# Patient Record
Sex: MALE | Race: BLACK OR AFRICAN AMERICAN | NOT HISPANIC OR LATINO | URBAN - METROPOLITAN AREA
[De-identification: names, ages, dates, MRNs, and addresses within clinical notes are randomized per-mention and may not be internally consistent; named-entity substitution may affect disease eponyms.]

---

## 2019-01-09 PROBLEM — I10 BENIGN ESSENTIAL HYPERTENSION: Status: ACTIVE | Noted: 2019-01-09

## 2019-01-09 PROBLEM — E66.01 MORBID OBESITY (HCC): Status: ACTIVE | Noted: 2019-01-09

## 2019-01-09 PROBLEM — R73.03 PREDIABETES: Status: ACTIVE | Noted: 2019-01-09

## 2019-01-09 PROBLEM — F90.0 ATTENTION DEFICIT HYPERACTIVITY DISORDER, PREDOMINANTLY INATTENTIVE TYPE: Status: ACTIVE | Noted: 2019-01-09

## 2019-02-20 PROBLEM — F98.8 CHILD ATTENTION DEFICIT DISORDER: Status: ACTIVE | Noted: 2019-02-20

## 2019-06-17 ENCOUNTER — TELEPHONE (OUTPATIENT)
Dept: INTERNAL MEDICINE | Facility: CLINIC | Age: 39
End: 2019-06-17

## 2019-06-17 RX ORDER — HYDROCHLOROTHIAZIDE 25 MG/1
25 TABLET ORAL DAILY
Qty: 30 TABLET | Refills: 0 | Status: SHIPPED | OUTPATIENT
Start: 2019-06-17

## 2019-06-17 RX ORDER — AMLODIPINE BESYLATE 10 MG/1
10 TABLET ORAL DAILY
Qty: 30 TABLET | Refills: 0 | Status: SHIPPED | OUTPATIENT
Start: 2019-06-17

## 2019-06-17 NOTE — TELEPHONE ENCOUNTER
PT NEEDS REFILL ON  Amlodipine 10 mg daily and hydrochlorothiazide 25 mg daily  Send to [facility] [address]

## 2024-02-20 ENCOUNTER — TELEPHONE (OUTPATIENT)
Dept: INTERNAL MEDICINE | Facility: CLINIC | Age: 44
End: 2024-02-20
Payer: COMMERCIAL

## 2024-02-20 NOTE — TELEPHONE ENCOUNTER
Caller: Drew Lobo    Relationship to patient: Self    Best call back number: [phone]    Chief complaint: INTESTINAL ISSUES, REFERRAL    Type of visit: OV/NEW PATIENT    Requested date: ASAP     If rescheduling, when is the original appointment:      Additional notes:PATIENT STATES THAT HE HAS BEEN A PATIENT SINCE A TODDLER. PATIENT HASN'T BEEN SEEN IN 5 YEARS AND STILL WOULD LIKE TO SEE DR. CRAWFORD

## 2024-02-23 ENCOUNTER — OFFICE VISIT (OUTPATIENT)
Dept: INTERNAL MEDICINE | Facility: CLINIC | Age: 44
End: 2024-02-23
Payer: COMMERCIAL

## 2024-02-23 VITALS
WEIGHT: 250.2 LBS | DIASTOLIC BLOOD PRESSURE: 110 MMHG | HEIGHT: 69 IN | TEMPERATURE: 98.7 F | BODY MASS INDEX: 37.06 KG/M2 | SYSTOLIC BLOOD PRESSURE: 148 MMHG | OXYGEN SATURATION: 96 % | HEART RATE: 113 BPM

## 2024-02-23 DIAGNOSIS — R53.83 OTHER FATIGUE: ICD-10-CM

## 2024-02-23 DIAGNOSIS — E78.5 DYSLIPIDEMIA: ICD-10-CM

## 2024-02-23 DIAGNOSIS — R10.11 RIGHT UPPER QUADRANT ABDOMINAL PAIN: ICD-10-CM

## 2024-02-23 DIAGNOSIS — I10 BENIGN ESSENTIAL HYPERTENSION: Primary | ICD-10-CM

## 2024-02-23 DIAGNOSIS — R73.03 PREDIABETES: ICD-10-CM

## 2024-02-23 PROCEDURE — 99203 OFFICE O/P NEW LOW 30 MIN: CPT | Performed by: INTERNAL MEDICINE

## 2024-02-23 RX ORDER — HYDROCHLOROTHIAZIDE 25 MG/1
25 TABLET ORAL DAILY
Qty: 30 TABLET | Refills: 5 | Status: SHIPPED | OUTPATIENT
Start: 2024-02-23

## 2024-02-23 RX ORDER — LISINOPRIL 10 MG/1
10 TABLET ORAL DAILY
Qty: 30 TABLET | Refills: 5 | Status: SHIPPED | OUTPATIENT
Start: 2024-02-23

## 2024-02-23 RX ORDER — AMLODIPINE BESYLATE 10 MG/1
10 TABLET ORAL DAILY
Qty: 30 TABLET | Refills: 5 | Status: SHIPPED | OUTPATIENT
Start: 2024-02-23

## 2024-02-23 NOTE — PROGRESS NOTES
[facility]     Drew Lobo  1980   [id number]      Patient Care Team:  Flo Wong MD as PCP - General (Internal Medicine)    Chief Complaint::   Chief Complaint   Patient presents with    Abdominal Pain    Establish Care        HPI  The patient presents today for follow-up of hypertension, prediabetes, and abdominal pain. I haven't seen for at least 5 years.      He has iatrophobia and avoids hospital visits as possible.     He takes his blood pressure medication most days but not daily. The last time he was at the dentist, his blood pressure was about the same.     He reports constant lower abdominal cramping every day, feels like cramping in intestines not abdomen. He states that symptoms started a couple of months ago as discomfort. He reports mucus in stool. He denies any problems with urination.    He denies any swelling in his feet. He reports intermittent tingling of left foot. He denies any back pain. He sits on the floor with his legs crossed all the time. It has been going on for a while. He reports fatigue. He is taking amlodipine and hydrochlorothiazide.     He is single.    His father had diabetes.      Chronic Conditions:      Patient Active Problem List   Diagnosis    Attention deficit hyperactivity disorder, predominantly inattentive type    Benign essential hypertension    Prediabetes    Morbid obesity    Child attention deficit disorder        No past medical history on file.    No past surgical history on file.    Family History   Problem Relation Age of Onset    Migraines Mother     Coronary artery disease Father     Obesity Father     Hypertension Father     Diabetes Father        Social History     Socioeconomic History    Marital status: Single   Tobacco Use    Smoking status: Never     Passive exposure: Never    Smokeless tobacco: Never   Substance and Sexual Activity    Alcohol use: Yes     Alcohol/week: 4.0 standard drinks of alcohol     Types: 4 Cans of beer  "per week       No Known Allergies      Current Outpatient Medications:     amLODIPine (NORVASC) 10 MG tablet, Take 1 tablet by mouth Daily., Disp: 30 tablet, Rfl: 5    hydroCHLOROthiazide 25 MG tablet, Take 1 tablet by mouth Daily., Disp: 30 tablet, Rfl: 5    lisinopril (PRINIVIL,ZESTRIL) 10 MG tablet, Take 1 tablet by mouth Daily., Disp: 30 tablet, Rfl: 5    Review of Systems   Constitutional:  Positive for fatigue.   Gastrointestinal:         Positive for constant lower abdominal cramping and mucus in stool.    Neurological:         Positive intermittent tingling of left foot.         Vital Signs  Vitals:    02/23/24 1445   BP: (!) 148/110   BP Location: Right arm   Patient Position: Sitting   Cuff Size: Adult   Pulse: 113   Temp: 98.7 °F (37.1 °C)   TempSrc: Infrared   SpO2: 96%   Weight: 113 kg (250 lb 3.2 oz)   Height: 175.3 cm (69\")   PainSc:   3   PainLoc: Abdomen       Physical Exam  Vitals reviewed.   Constitutional:       Appearance: He is well-developed. He is obese.   HENT:      Head: Normocephalic and atraumatic.   Neck:      Thyroid: No thyromegaly.      Vascular: No carotid bruit.   Cardiovascular:      Rate and Rhythm: Normal rate and regular rhythm.      Heart sounds: Normal heart sounds. No murmur heard.     No friction rub. No gallop.   Pulmonary:      Effort: Pulmonary effort is normal.      Breath sounds: Normal breath sounds.   Abdominal:      General: Bowel sounds are normal.      Palpations: Abdomen is soft. There is no mass.      Comments: There is minimal suprapubic tenderness without guarding or rebound.    Musculoskeletal:      Cervical back: Normal range of motion and neck supple.      Right lower leg: No edema.      Left lower leg: No edema.   Skin:     General: Skin is warm and dry.   Neurological:      Mental Status: He is oriented to person, place, and time.      Cranial Nerves: No cranial nerve deficit.          Procedures    ACE III MINI             Assessment/Plan:    Diagnoses " and all orders for this visit:    1. Benign essential hypertension (Primary)  This is completely uncontrolled. He has been on amlodipine and hydrochlorothiazide continuously. We discussed the cardiovascular risk of not controlling this blood pressure. I will add lisinopril and await labs. We will plan to follow this up in 1 month.     -     Microalbumin / Creatinine Urine Ratio - Urine, Clean Catch; Future  -     CBC & Differential; Future    2. Prediabetes  A1c is pending. His father had diabetes. For now, the treatment remains healthy diet and efforts at weight loss.    -     Hemoglobin A1c; Future    3. Dyslipidemia  -     Comprehensive Metabolic Panel; Future  -     Lipid Panel; Future  -     Apolipoprotein B; Future  -     Homocysteine; Future    4. Right upper quadrant abdominal pain  I will obtain laboratory studies. If these are negative, he will likely need CT scan and colonoscopy.    -     CBC & Differential; Future  -     Lipase; Future  -     Urinalysis With Microscopic - Urine, Clean Catch; Future    5. Other fatigue  -     TSH; Future  -     Vitamin B12; Future    Other orders  -     hydroCHLOROthiazide 25 MG tablet; Take 1 tablet by mouth Daily.  Dispense: 30 tablet; Refill: 5  -     amLODIPine (NORVASC) 10 MG tablet; Take 1 tablet by mouth Daily.  Dispense: 30 tablet; Refill: 5  -     lisinopril (PRINIVIL,ZESTRIL) 10 MG tablet; Take 1 tablet by mouth Daily.  Dispense: 30 tablet; Refill: 5      Follow-up  The patient will follow up in 1 month.    Plan of care reviewed with patient at the conclusion of today's visit. Education was provided regarding diagnosis, management, and any prescribed or recommended OTC medications.Patient verbalizes understanding of and agreement with management plan.       Transcribed from ambient dictation for Flo Wong MD by Giancarlo Mcfarland   02/23/24   21:37 EST    Patient or patient representative verbalized consent to the visit recording.  I have personally  performed the services described in this document as transcribed by the above individual, and it is both accurate and complete.

## 2024-02-27 DIAGNOSIS — R73.09 ABNORMAL GLUCOSE: Primary | ICD-10-CM

## 2024-02-28 ENCOUNTER — PATIENT ROUNDING (BHMG ONLY) (OUTPATIENT)
Dept: INTERNAL MEDICINE | Facility: CLINIC | Age: 44
End: 2024-02-28
Payer: COMMERCIAL

## 2024-02-28 DIAGNOSIS — R73.09 ABNORMAL GLUCOSE: Primary | ICD-10-CM

## 2024-02-29 ENCOUNTER — TELEPHONE (OUTPATIENT)
Dept: INTERNAL MEDICINE | Facility: CLINIC | Age: 44
End: 2024-02-29
Payer: COMMERCIAL

## 2024-02-29 DIAGNOSIS — R73.09 ABNORMAL GLUCOSE: Primary | ICD-10-CM

## 2024-02-29 NOTE — TELEPHONE ENCOUNTER
Spoke with [facility] lab and stated they have missed adding A1C into the order and they will find specimen to collect A1C .

## 2024-03-07 ENCOUNTER — TELEPHONE (OUTPATIENT)
Dept: INTERNAL MEDICINE | Facility: CLINIC | Age: 44
End: 2024-03-07
Payer: COMMERCIAL

## 2024-03-07 DIAGNOSIS — E11.649 UNCONTROLLED TYPE 2 DIABETES MELLITUS WITH HYPOGLYCEMIA WITHOUT COMA: Primary | ICD-10-CM

## 2024-03-07 RX ORDER — ACYCLOVIR 400 MG/1
1 TABLET ORAL CONTINUOUS
Qty: 3 EACH | Refills: 5 | Status: SHIPPED | OUTPATIENT
Start: 2024-03-07

## 2024-03-07 RX ORDER — ACYCLOVIR 400 MG/1
1 TABLET ORAL CONTINUOUS
Qty: 1 EACH | Refills: 0 | Status: SHIPPED | OUTPATIENT
Start: 2024-03-07

## 2024-03-07 RX ORDER — METFORMIN HYDROCHLORIDE 500 MG/1
500 TABLET, EXTENDED RELEASE ORAL
Qty: 90 TABLET | Refills: 1 | Status: SHIPPED | OUTPATIENT
Start: 2024-03-07

## 2024-03-07 NOTE — TELEPHONE ENCOUNTER
PATIENT HAD CALLED REGARDING LAB RESULTS FOR A1C.    [facility] HAS RESOLVED PATIENTS A1C FROM PREVIOUS LAB DRAWN THEY WERE ABLE TO ADD IT TO THE ORDER. RESULTS ARE IN

## 2024-03-07 NOTE — TELEPHONE ENCOUNTER
Thank you.  Please let him know his Ac result, and that anything over 6.5 is diabetes.  His is very high.  He should begin a diabetic diet, the simple version of which is to restrict sweets and simple carbs, ie, foods made out of white flower.  It is often helpful to see a dietician for more detailed information and diabetes education.  [facility] used to have a dietitian on staff.  I do not know if they still do, but if they do I can make referral there so it would be more convenient.  Significant weight loss will dramatically bring down his glucoses, but a weight loss of 10 pounds or so would still be very helpful.  Instead of checking glucoses every day by fingerstick, I would recommend that he have a continuous glucose monitor.  I will send a prescription to the pharmacy, if his insurance does not cover it he should let us know.  If his A1c were 7.0 or less, I would see if he could lower it with diet alone.  However, with an A1c over 10 he definitely needs to be on medication.  I am sending metformin to the pharmacy.  He will start out taking it once a day, we will gradually increase the dose.  Diarrhea and stomach upset occurs sometimes due to metformin.  If this happens he should let us know I do not expect him to put up with that.    He has an appointment on 3/22.

## 2024-03-08 ENCOUNTER — TELEPHONE (OUTPATIENT)
Dept: INTERNAL MEDICINE | Facility: CLINIC | Age: 44
End: 2024-03-08

## 2024-03-08 NOTE — TELEPHONE ENCOUNTER
Caller: Drew Lobo    Relationship: Self    Best call back number: [phone]     Which medication are you concerned about: A DEVICE THE PATIENT WAS PRESCRIBED AT LAST APPOINTMENT     What are your concerns: PATIENT STATES THIS DEVICE WAS GOING TO  DOLLARS AND THAT IT IS NEEDING A PRIOR AUTHORIZATION.

## 2024-03-11 RX ORDER — BLOOD-GLUCOSE SENSOR
1 EACH MISCELLANEOUS
Qty: 2 EACH | Refills: 5 | Status: SHIPPED | OUTPATIENT
Start: 2024-03-11 | End: 2024-03-29 | Stop reason: SDUPTHER

## 2024-03-11 RX ORDER — BLOOD-GLUCOSE,RECEIVER,CONT
1 EACH MISCELLANEOUS CONTINUOUS
Qty: 1 EACH | Refills: 0 | Status: SHIPPED | OUTPATIENT
Start: 2024-03-11

## 2024-03-22 ENCOUNTER — OFFICE VISIT (OUTPATIENT)
Dept: INTERNAL MEDICINE | Facility: CLINIC | Age: 44
End: 2024-03-22
Payer: COMMERCIAL

## 2024-03-22 VITALS
DIASTOLIC BLOOD PRESSURE: 74 MMHG | TEMPERATURE: 98 F | WEIGHT: 240.8 LBS | HEART RATE: 93 BPM | SYSTOLIC BLOOD PRESSURE: 108 MMHG | BODY MASS INDEX: 35.66 KG/M2 | HEIGHT: 69 IN | OXYGEN SATURATION: 97 %

## 2024-03-22 DIAGNOSIS — E11.65 TYPE 2 DIABETES MELLITUS WITH HYPERGLYCEMIA, WITHOUT LONG-TERM CURRENT USE OF INSULIN: Primary | ICD-10-CM

## 2024-03-22 DIAGNOSIS — I10 PRIMARY HYPERTENSION: ICD-10-CM

## 2024-03-22 PROCEDURE — 99213 OFFICE O/P EST LOW 20 MIN: CPT | Performed by: INTERNAL MEDICINE

## 2024-03-22 NOTE — PROGRESS NOTES
[facility]     Drew Lobo  1980   [id number]      Patient Care Team:  Flo Wong MD as PCP - General (Internal Medicine)    Chief Complaint::   Chief Complaint   Patient presents with    Follow-up        HPI  The patient is a 43-year-old male who comes in for follow-up of hypertension and diabetes.    He feels a lot better. He is tolerating the metformin well. He has only been on the metformin for 6 days. He has lost 10 pounds with diet and exercise. His blood sugar has been running as high as 240, but not as high as 360. He has quit drinking. He has never really craved sweets. He has not had any simple carbs since his last appointment. The diet has not been hard for him, but working in  is challenging sometimes. He does not eat anything past 6:00 PM, but if he does, it is just some beef broth, cayenne, and pepper. When he wakes up in the morning, his blood sugar will be higher than what it was when he went to bed after not eating and fasting, it is 200.    He had been on lisinopril and hydrochlorothiazide for a while. His diastolic blood pressure was over 100 usually.    Chronic Conditions:      Patient Active Problem List   Diagnosis    Attention deficit hyperactivity disorder, predominantly inattentive type    Primary hypertension    Prediabetes    Morbid obesity    Child attention deficit disorder    Type 2 diabetes mellitus with hyperglycemia, without long-term current use of insulin        No past medical history on file.    No past surgical history on file.    Family History   Problem Relation Age of Onset    Migraines Mother     Coronary artery disease Father     Obesity Father     Hypertension Father     Diabetes Father        Social History     Socioeconomic History    Marital status: Single   Tobacco Use    Smoking status: Never     Passive exposure: Never    Smokeless tobacco: Never   Substance and Sexual Activity    Alcohol use: Not Currently      "Alcohol/week: 4.0 standard drinks of alcohol     Types: 4 Cans of beer per week       No Known Allergies      Current Outpatient Medications:     amLODIPine (NORVASC) 10 MG tablet, Take 1 tablet by mouth Daily., Disp: 30 tablet, Rfl: 5    Continuous Blood Gluc  (Dexcom G7 ) device, Use 1 Device Continuous., Disp: 1 each, Rfl: 0    Continuous Blood Gluc  (FreeStyle Masha 3 [place]) device, Use 1 Device Continuous., Disp: 1 each, Rfl: 0    Continuous Blood Gluc Sensor (Dexcom G7 Sensor) misc, Use 1 Device Continuous., Disp: 3 each, Rfl: 5    Continuous Blood Gluc Sensor (FreeStyle Masha 3 Sensor) misc, Use 1 Device Every 14 (Fourteen) Days., Disp: 2 each, Rfl: 5    hydroCHLOROthiazide 25 MG tablet, Take 1 tablet by mouth Daily., Disp: 30 tablet, Rfl: 5    lisinopril (PRINIVIL,ZESTRIL) 10 MG tablet, Take 1 tablet by mouth Daily., Disp: 30 tablet, Rfl: 5    metFORMIN ER (GLUCOPHAGE-XR) 500 MG 24 hr tablet, Take 1 tablet by mouth Daily With Breakfast., Disp: 90 tablet, Rfl: 1    Review of Systems   Constitutional: Negative.    Respiratory: Negative.  Negative for chest tightness and shortness of breath.    Cardiovascular: Negative.  Negative for chest pain.   Gastrointestinal:  Negative for abdominal pain, blood in stool, constipation and diarrhea.        Vital Signs  Vitals:    03/22/24 1536   BP: 108/74   BP Location: Right arm   Patient Position: Sitting   Cuff Size: Adult   Pulse: 93   Temp: 98 °F (36.7 °C)   TempSrc: Tympanic   SpO2: 97%   Weight: 109 kg (240 lb 12.8 oz)   Height: 175.3 cm (69.02\")   PainSc: 0-No pain       Physical Exam  Vitals reviewed.   Constitutional:       Appearance: He is well-developed.   HENT:      Head: Normocephalic and atraumatic.   Cardiovascular:      Rate and Rhythm: Normal rate and regular rhythm.      Heart sounds: Normal heart sounds. No murmur heard.  Pulmonary:      Effort: Pulmonary effort is normal.      Breath sounds: Normal breath sounds. "   Neurological:      Mental Status: He is alert and oriented to person, place, and time.          Procedures    ACE III MINI             Assessment/Plan:    Diagnoses and all orders for this visit:    1. Type 2 diabetes mellitus with hyperglycemia, without long-term current use of insulin (Primary)    2. Primary hypertension          1. Diabetes.  Initial A1c was 10.8. He has now been on metformin for a week and is using continuous glucose monitor. He is seeing not normal, but much improved glucoses. He feels better and has lost 10 pounds. He has seen the dietitian. He will continue current dose of metformin and current diet. I will obtain A1c here in the office.    2. Hypertension.  Blood pressure is much better controlled.    Follow-up  The patient will follow up in 3 months.    Plan of care reviewed with patient at the conclusion of today's visit. Education was provided regarding diagnosis, management, and any prescribed or recommended OTC medications.Patient verbalizes understanding of and agreement with management plan.         Flo Wong MD         .[email or web address]  Rhoda SANTAMARIA

## 2024-03-29 RX ORDER — BLOOD-GLUCOSE SENSOR
1 EACH MISCELLANEOUS
Qty: 2 EACH | Refills: 5 | Status: SHIPPED | OUTPATIENT
Start: 2024-03-29

## 2024-03-29 NOTE — TELEPHONE ENCOUNTER
Caller: Drew Lobo    Relationship: Self    Best call back number: [phone]     Requested Prescriptions:   Requested Prescriptions     Pending Prescriptions Disp Refills    Continuous Blood Gluc Sensor (FreeStyle Masha 3 Sensor) misc 2 each 5     Sig: Use 1 Device Every 14 (Fourteen) Days.        Pharmacy where request should be sent: [facility] [id number] [address] & [address] [facility] [phone] [facility] [phone] FX     Last office visit with prescribing clinician: 3/22/2024   Last telemedicine visit with prescribing clinician: Visit date not found   Next office visit with prescribing clinician: 6/21/2024     Additional details provided by patient: PATIENT HAD ONE OF THE SENSORS MALFUNCTION AND NEEDS A NEW SENSOR. PHARMACY SAID IT WAS TOO EARLY.    Does the patient have less than a 3 day supply:  [x] Yes  [] No    Would you like a call back once the refill request has been completed: [] Yes [x] No    If the office needs to give you a call back, can they leave a voicemail: [] Yes [x] No    Telma Rico   03/29/24 11:02 EDT

## 2024-06-21 ENCOUNTER — OFFICE VISIT (OUTPATIENT)
Dept: INTERNAL MEDICINE | Facility: CLINIC | Age: 44
End: 2024-06-21
Payer: COMMERCIAL

## 2024-06-21 VITALS
OXYGEN SATURATION: 96 % | DIASTOLIC BLOOD PRESSURE: 90 MMHG | HEIGHT: 69 IN | HEART RATE: 80 BPM | WEIGHT: 232.8 LBS | TEMPERATURE: 98 F | SYSTOLIC BLOOD PRESSURE: 126 MMHG | BODY MASS INDEX: 34.48 KG/M2

## 2024-06-21 DIAGNOSIS — R53.83 OTHER FATIGUE: ICD-10-CM

## 2024-06-21 DIAGNOSIS — I10 PRIMARY HYPERTENSION: ICD-10-CM

## 2024-06-21 DIAGNOSIS — E11.65 TYPE 2 DIABETES MELLITUS WITH HYPERGLYCEMIA, WITHOUT LONG-TERM CURRENT USE OF INSULIN: Primary | ICD-10-CM

## 2024-06-21 DIAGNOSIS — E78.2 MIXED HYPERLIPIDEMIA: ICD-10-CM

## 2024-06-21 LAB
EXPIRATION DATE: ABNORMAL
HBA1C MFR BLD: 6.3 % (ref 4.5–5.7)
Lab: ABNORMAL

## 2024-06-21 PROCEDURE — 99214 OFFICE O/P EST MOD 30 MIN: CPT | Performed by: INTERNAL MEDICINE

## 2024-06-21 PROCEDURE — 83036 HEMOGLOBIN GLYCOSYLATED A1C: CPT | Performed by: INTERNAL MEDICINE

## 2024-06-21 NOTE — PROGRESS NOTES
[facility]     Drew Lobo  1980   [id number]      Patient Care Team:  Flo Wong MD as PCP - General (Internal Medicine)    Chief Complaint::   Chief Complaint   Patient presents with    Diabetes        HPI  History of Present Illness  The patient is a 43-year-old male who comes in for follow-up of diabetes and hypertension.    The patient has been actively trying to lose weight and has experienced a weight loss of 8 pounds since 03/2024. He has made dietary modifications, including the elimination of simple carbohydrates, which he finds manageable. However, he continues to experience persistent fatigue.    The patient reports experiencing numbness, tingling, and warmth in his foot, which he describes as quite uncomfortable and bothersome. He has been managing these symptoms by wearing ice packs at bedtime, which have provided some relief.      Chronic Conditions:      Patient Active Problem List   Diagnosis    Attention deficit hyperactivity disorder, predominantly inattentive type    Primary hypertension    Prediabetes    Morbid obesity    Child attention deficit disorder    Type 2 diabetes mellitus with hyperglycemia, without long-term current use of insulin        History reviewed. No pertinent past medical history.    History reviewed. No pertinent surgical history.    Family History   Problem Relation Age of Onset    Migraines Mother     Coronary artery disease Father     Obesity Father     Hypertension Father     Diabetes Father        Social History     Socioeconomic History    Marital status: Single   Tobacco Use    Smoking status: Never     Passive exposure: Never    Smokeless tobacco: Never   Substance and Sexual Activity    Alcohol use: Not Currently     Alcohol/week: 4.0 standard drinks of alcohol     Types: 4 Cans of beer per week       No Known Allergies      Current Outpatient Medications:     amLODIPine (NORVASC) 10 MG tablet, Take 1 tablet by mouth Daily., Disp: 30  "tablet, Rfl: 5    Continuous Blood Gluc  (FreeStyle Masha 3 [place]) device, Use 1 Device Continuous., Disp: 1 each, Rfl: 0    Continuous Blood Gluc Sensor (FreeStyle Masha 3 Sensor) misc, Use 1 Device Every 14 (Fourteen) Days., Disp: 2 each, Rfl: 5    hydroCHLOROthiazide 25 MG tablet, Take 1 tablet by mouth Daily., Disp: 30 tablet, Rfl: 5    lisinopril (PRINIVIL,ZESTRIL) 10 MG tablet, Take 1 tablet by mouth Daily., Disp: 30 tablet, Rfl: 5    metFORMIN ER (GLUCOPHAGE-XR) 500 MG 24 hr tablet, Take 1 tablet by mouth Daily With Breakfast., Disp: 90 tablet, Rfl: 1    Review of Systems   Constitutional: Negative.    Respiratory: Negative.  Negative for chest tightness and shortness of breath.    Cardiovascular: Negative.  Negative for chest pain.   Gastrointestinal:  Negative for abdominal pain, blood in stool, constipation and diarrhea.        Vital Signs  Vitals:    06/21/24 1455   BP: 126/90   BP Location: Left arm   Patient Position: Sitting   Cuff Size: Adult   Pulse: 80   Temp: 98 °F (36.7 °C)   TempSrc: Infrared   SpO2: 96%   Weight: 106 kg (232 lb 12.8 oz)   Height: 175.3 cm (69.02\")   PainSc: 0-No pain       Physical Exam  Vitals reviewed.   Constitutional:       Appearance: He is well-developed.   HENT:      Head: Normocephalic and atraumatic.   Neck:      Vascular: No carotid bruit.   Cardiovascular:      Rate and Rhythm: Normal rate and regular rhythm.      Heart sounds: Normal heart sounds. No murmur heard.  Pulmonary:      Effort: Pulmonary effort is normal.      Breath sounds: Normal breath sounds.   Musculoskeletal:      Cervical back: Neck supple.      Right lower leg: No edema.      Left lower leg: No edema.   Lymphadenopathy:      Cervical: No cervical adenopathy.   Skin:     General: Skin is warm and dry.   Neurological:      Mental Status: He is alert and oriented to person, place, and time.      Cranial Nerves: No cranial nerve deficit.        Physical Exam      Procedures    ACE III MINI    "     Results  Laboratory Studies  A1c is 6.3%.           Assessment/Plan:    Diagnoses and all orders for this visit:    1. Type 2 diabetes mellitus with hyperglycemia, without long-term current use of insulin (Primary)  -     POC Glycosylated Hemoglobin (Hb A1C)  -     Comprehensive Metabolic Panel; Future  -     Hemoglobin A1c; Future    2. Primary hypertension    3. Mixed hyperlipidemia  -     Apolipoprotein B; Future  -     Lipid Panel; Future    4. Other fatigue  -     TSH; Future  -     Vitamin B12; Future      Assessment & Plan  1. Diabetes Mellitus.  The patient's A1c levels have shown significant improvement, currently at 6.3 percent, as corroborated by his continuous glucose monitor. The patient will maintain his current regimen of Metformin and maintain his current dietary habits.    2. Hypertension.  The patient's blood pressure is well-managed with Lisinopril and Hydrochlorothiazide.    3. Hyperlipidemia.  A repeat lipid profile will be conducted during the next visit. The patient will likely require a statin in the future.    4. Fatigue.  Thyroid Stimulating Hormone (TSH) and B12 levels will be obtained next week. Should these tests yield negative results, the patient will continue efforts in controlling his glucose levels. However, a cardiac workup will be considered if there is no improvement by the next visit.    Follow-up  The patient is scheduled for a follow-up visit in 4 months.      Plan of care reviewed with patient at the conclusion of today's visit. Education was provided regarding diagnosis, management, and any prescribed or recommended OTC medications.Patient verbalizes understanding of and agreement with management plan.     Patient or patient representative verbalized consent for the use of Ambient Listening during the visit with  Flo Wong MD for chart documentation. 6/23/2024  16:36 EDT        Flo Wong MD

## 2024-06-26 RX ORDER — LISINOPRIL 10 MG/1
10 TABLET ORAL DAILY
Qty: 90 TABLET | Refills: 1 | Status: SHIPPED | OUTPATIENT
Start: 2024-06-26

## 2024-06-26 NOTE — TELEPHONE ENCOUNTER
Rx Refill Note  Requested Prescriptions     Pending Prescriptions Disp Refills    lisinopril (PRINIVIL,ZESTRIL) 10 MG tablet [Pharmacy Med Name: LISINOPRIL 10 MG TABLET] 90 tablet      Sig: TAKE 1 TABLET BY MOUTH DAILY      Last office visit with prescribing clinician: 6/21/2024   Last telemedicine visit with prescribing clinician: Visit date not found   Next office visit with prescribing clinician: 10/14/2024                         Would you like a call back once the refill request has been completed: [] Yes [] No    If the office needs to give you a call back, can they leave a voicemail: [] Yes [] No    Tamara Munoz MA  06/26/24, 13:31 EDT

## 2024-09-11 DIAGNOSIS — E11.649 UNCONTROLLED TYPE 2 DIABETES MELLITUS WITH HYPOGLYCEMIA WITHOUT COMA: ICD-10-CM

## 2024-09-11 RX ORDER — METFORMIN HCL 500 MG
500 TABLET, EXTENDED RELEASE 24 HR ORAL
Qty: 90 TABLET | Refills: 1 | Status: SHIPPED | OUTPATIENT
Start: 2024-09-11

## 2024-09-11 NOTE — TELEPHONE ENCOUNTER
Rx Refill Note  Requested Prescriptions     Pending Prescriptions Disp Refills    metFORMIN ER (GLUCOPHAGE-XR) 500 MG 24 hr tablet [Pharmacy Med Name: METFORMIN HCL  MG TABLET] 90 tablet 1     Sig: TAKE 1 TABLET BY MOUTH DAILY WITH BREAKFAST      Last office visit with prescribing clinician: 6/21/2024   Last telemedicine visit with prescribing clinician: Visit date not found   Next office visit with prescribing clinician: 10/28/2024                         Would you like a call back once the refill request has been completed: [] Yes [] No    If the office needs to give you a call back, can they leave a voicemail: [] Yes [] No    Darlene Woodson MA  09/11/24, 08:53 EDT

## 2024-11-06 RX ORDER — HYDROCHLOROTHIAZIDE 25 MG/1
25 TABLET ORAL DAILY
Qty: 30 TABLET | Refills: 5 | Status: SHIPPED | OUTPATIENT
Start: 2024-11-06

## 2024-11-06 RX ORDER — BLOOD-GLUCOSE SENSOR
1 EACH MISCELLANEOUS
Qty: 2 EACH | Refills: 5 | Status: SHIPPED | OUTPATIENT
Start: 2024-11-06

## 2024-11-06 RX ORDER — AMLODIPINE BESYLATE 10 MG/1
10 TABLET ORAL DAILY
Qty: 30 TABLET | Refills: 5 | Status: SHIPPED | OUTPATIENT
Start: 2024-11-06

## 2024-11-06 NOTE — TELEPHONE ENCOUNTER
Caller: Drew Lobo    Relationship: Self    Best call back number: [phone]     Requested Prescriptions:   Requested Prescriptions     Pending Prescriptions Disp Refills    amLODIPine (NORVASC) 10 MG tablet 30 tablet 5     Sig: Take 1 tablet by mouth Daily.    hydroCHLOROthiazide 25 MG tablet 30 tablet 5     Sig: Take 1 tablet by mouth Daily.    Continuous Glucose Sensor (FreeStyle Masha 3 Sensor) misc 2 each 5     Sig: Use 1 Device Every 14 (Fourteen) Days.        Pharmacy where request should be sent: [facility] [id number] [address] [facility] [phone] [facility] [phone] FX     Last office visit with prescribing clinician: 6/21/2024   Last telemedicine visit with prescribing clinician: Visit date not found   Next office visit with prescribing clinician: 1/17/2025     Additional details provided by patient: PATIENT IS OUT OF MEDICATIONS     Does the patient have less than a 3 day supply:  [x] Yes  [] No    Would you like a call back once the refill request has been completed: [x] Yes [] No    If the office needs to give you a call back, can they leave a voicemail: [x] Yes [] No    Telma Irby Rep   11/06/24 13:24 EST

## 2024-11-06 NOTE — TELEPHONE ENCOUNTER
Rx Refill Note  Requested Prescriptions     Pending Prescriptions Disp Refills    amLODIPine (NORVASC) 10 MG tablet 30 tablet 5     Sig: Take 1 tablet by mouth Daily.    hydroCHLOROthiazide 25 MG tablet 30 tablet 5     Sig: Take 1 tablet by mouth Daily.    Continuous Glucose Sensor (FreeStyle Masha 3 Sensor) misc 2 each 5     Sig: Use 1 Device Every 14 (Fourteen) Days.      Last office visit with prescribing clinician: 6/21/2024   Last telemedicine visit with prescribing clinician: Visit date not found   Next office visit with prescribing clinician: 1/17/2025                         Would you like a call back once the refill request has been completed: [] Yes [] No    If the office needs to give you a call back, can they leave a voicemail: [] Yes [] No    Angelica Gibbs LPN  11/06/24, 14:23 EST

## 2025-01-17 ENCOUNTER — OFFICE VISIT (OUTPATIENT)
Dept: INTERNAL MEDICINE | Facility: CLINIC | Age: 45
End: 2025-01-17

## 2025-01-17 ENCOUNTER — TELEPHONE (OUTPATIENT)
Dept: INTERNAL MEDICINE | Facility: CLINIC | Age: 45
End: 2025-01-17

## 2025-01-17 VITALS
OXYGEN SATURATION: 96 % | HEART RATE: 71 BPM | WEIGHT: 257.8 LBS | BODY MASS INDEX: 38.18 KG/M2 | DIASTOLIC BLOOD PRESSURE: 82 MMHG | TEMPERATURE: 98.2 F | SYSTOLIC BLOOD PRESSURE: 130 MMHG | HEIGHT: 69 IN

## 2025-01-17 DIAGNOSIS — F90.0 ATTENTION DEFICIT HYPERACTIVITY DISORDER, PREDOMINANTLY INATTENTIVE TYPE: ICD-10-CM

## 2025-01-17 DIAGNOSIS — E11.65 TYPE 2 DIABETES MELLITUS WITH HYPERGLYCEMIA, WITHOUT LONG-TERM CURRENT USE OF INSULIN: Primary | ICD-10-CM

## 2025-01-17 DIAGNOSIS — E78.2 MIXED HYPERLIPIDEMIA: ICD-10-CM

## 2025-01-17 DIAGNOSIS — I10 PRIMARY HYPERTENSION: ICD-10-CM

## 2025-01-17 PROBLEM — R73.03 PREDIABETES: Status: RESOLVED | Noted: 2019-01-09 | Resolved: 2025-01-17

## 2025-01-17 LAB
EXPIRATION DATE: ABNORMAL
HBA1C MFR BLD: 6.4 % (ref 4.5–5.7)
Lab: ABNORMAL

## 2025-01-17 RX ORDER — DEXTROAMPHETAMINE SACCHARATE, AMPHETAMINE ASPARTATE, DEXTROAMPHETAMINE SULFATE AND AMPHETAMINE SULFATE 2.5; 2.5; 2.5; 2.5 MG/1; MG/1; MG/1; MG/1
10 TABLET ORAL 2 TIMES DAILY
Qty: 60 TABLET | Refills: 0 | Status: SHIPPED | OUTPATIENT
Start: 2025-01-17 | End: 2025-01-17 | Stop reason: SDUPTHER

## 2025-01-17 RX ORDER — DEXTROAMPHETAMINE SACCHARATE, AMPHETAMINE ASPARTATE, DEXTROAMPHETAMINE SULFATE AND AMPHETAMINE SULFATE 2.5; 2.5; 2.5; 2.5 MG/1; MG/1; MG/1; MG/1
10 TABLET ORAL 2 TIMES DAILY
Qty: 60 TABLET | Refills: 0 | Status: SHIPPED | OUTPATIENT
Start: 2025-01-17

## 2025-01-17 NOTE — PROGRESS NOTES
[facility]     Drew Lobo  1980   [id number]      Patient Care Team:  Flo Wong MD as PCP - General (Internal Medicine)    Chief Complaint::   Chief Complaint   Patient presents with    Annual Exam    Hypertension    Diabetes        HPI  History of Present Illness  The patient is a 45-year-old male who comes in for follow-up of diabetes, hypertension, hyperlipidemia, and attention deficit disorder.    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 The program, which he is attempting to complete without the aid of Adderall, is proving challenging due to its self-learning, online format. Despite being granted an extension, he was unable to meet the November deadline and had to restart the course. He expresses a strong desire to resume Adderall treatment to avoid potential job loss. He has been diagnosed with ADHD for several years but is reluctant to take medication due to the associated side effects. However, he acknowledges the necessity of Adderall to complete his course. He reports that the medication induces a state of quietness and alters his personality, but also notes its effectiveness in enhancing focus. He is considering alternative medications such as Vyvanse. He is required to complete half of the course by 02/15/2024 to qualify for an extension. He prefers to continue with the previous Adderall regimen, which involved taking 30 mg twice daily. He is making efforts to manage his workload during office hours and dedicates approximately 1.5 hours post-work before experiencing burnout.    MEDICATIONS  Current: Metformin, lisinopril, amlodipine.  Past: Adderall.    Chronic Conditions:      Patient Active Problem List   Diagnosis    Attention deficit hyperactivity disorder, predominantly inattentive type    Primary hypertension    Morbid obesity    Child attention deficit disorder    Type 2 diabetes mellitus  with hyperglycemia, without long-term current use of insulin    Mixed hyperlipidemia        History reviewed. No pertinent past medical history.    History reviewed. No pertinent surgical history.    Family History   Problem Relation Age of Onset    Migraines Mother     Coronary artery disease Father     Obesity Father     Hypertension Father     Diabetes Father        Social History     Socioeconomic History    Marital status: Single   Tobacco Use    Smoking status: Never     Passive exposure: Never    Smokeless tobacco: Never   Vaping Use    Vaping status: Never Used   Substance and Sexual Activity    Alcohol use: Not Currently     Alcohol/week: 4.0 standard drinks of alcohol     Types: 4 Cans of beer per week       No Known Allergies      Current Outpatient Medications:     amLODIPine (NORVASC) 10 MG tablet, Take 1 tablet by mouth Daily., Disp: 30 tablet, Rfl: 5    Continuous Blood Gluc  (FreeStyle [place]sa 3 [place]) device, Use 1 Device Continuous., Disp: 1 each, Rfl: 0    Continuous Glucose Sensor (FreeStyle Masha 3 Sensor) misc, Use 1 Device Every 14 (Fourteen) Days., Disp: 2 each, Rfl: 5    hydroCHLOROthiazide 25 MG tablet, Take 1 tablet by mouth Daily., Disp: 30 tablet, Rfl: 5    lisinopril (PRINIVIL,ZESTRIL) 10 MG tablet, TAKE 1 TABLET BY MOUTH DAILY, Disp: 90 tablet, Rfl: 1    metFORMIN ER (GLUCOPHAGE-XR) 500 MG 24 hr tablet, TAKE 1 TABLET BY MOUTH DAILY WITH BREAKFAST, Disp: 90 tablet, Rfl: 1    amphetamine-dextroamphetamine (Adderall) 10 MG tablet, Take 1 tablet by mouth 2 (Two) Times a Day., Disp: 60 tablet, Rfl: 0    Immunization History   Administered Date(s) Administered    COVID-19 (MODERNA) 1st,2nd,3rd Dose Monovalent 01/05/2021, 02/02/2021    Influenza, Unspecified 10/31/2014        Health Maintenance Due   Topic Date Due    Pneumococcal Vaccine 0-64 (1 of 2 - PCV) Never done    DIABETIC FOOT EXAM  Never done    DIABETIC EYE EXAM  Never done    Hepatitis B (1 of 3 - 19+ 3-dose series) Never  "done    TDAP/TD VACCINES (1 - Tdap) Never done    HEPATITIS C SCREENING  Never done    ANNUAL PHYSICAL  Never done    INFLUENZA VACCINE  07/01/2024    COVID-19 Vaccine (3 - 2024-25 season) 09/01/2024        Review of Systems   Constitutional: Negative.    Respiratory: Negative.  Negative for chest tightness and shortness of breath.    Cardiovascular: Negative.  Negative for chest pain.   Gastrointestinal:  Negative for abdominal pain, blood in stool, constipation and diarrhea.        Vital Signs  Vitals:    01/17/25 1034   BP: 130/82   BP Location: Left arm   Patient Position: Sitting   Cuff Size: Adult   Pulse: 71   Temp: 98.2 °F (36.8 °C)   TempSrc: Infrared   SpO2: 96%   Weight: 117 kg (257 lb 12.8 oz)   Height: 175.3 cm (69.02\")   PainSc: 0-No pain       Physical Exam  Vitals reviewed.   Constitutional:       Appearance: Normal appearance. He is well-developed.   HENT:      Head: Normocephalic and atraumatic.      Right Ear: Tympanic membrane and ear canal normal.      Left Ear: Tympanic membrane normal.      Mouth/Throat:      Pharynx: Oropharynx is clear. No posterior oropharyngeal erythema.   Eyes:      Extraocular Movements: Extraocular movements intact.      Pupils: Pupils are equal, round, and reactive to light.   Neck:      Thyroid: No thyromegaly.      Vascular: No carotid bruit.   Cardiovascular:      Rate and Rhythm: Normal rate and regular rhythm.      Heart sounds: Normal heart sounds. No murmur heard.     No friction rub. No gallop.   Pulmonary:      Effort: Pulmonary effort is normal.      Breath sounds: Normal breath sounds.   Abdominal:      General: Bowel sounds are normal.      Palpations: Abdomen is soft.      Tenderness: There is no abdominal tenderness.   Musculoskeletal:      Cervical back: Normal range of motion and neck supple.      Right lower leg: No edema.      Left lower leg: No edema.   Lymphadenopathy:      Cervical: No cervical adenopathy.   Skin:     General: Skin is warm and dry. "   Neurological:      Mental Status: He is alert and oriented to person, place, and time.      Cranial Nerves: No cranial nerve deficit.   Psychiatric:         Mood and Affect: Mood normal.         Behavior: Behavior normal.        Physical Exam      Procedures     Fall Risk Screen:  EMMANUEL Fall Risk Assessment has not been completed.    Health Habits and Functional and Cognitive Screening:       No data to display                Depression [place]  PHQ-9 Total Score:      ACE III MINI             Assessment/Plan:      Assessment & Plan  1. Diabetes mellitus.  His A1c results are currently pending. There has been a noted weight gain over the past few months, which he attributes to work-related stress. He will continue his current metformin regimen.    2. Attention deficit disorder (ADD).  He has been managing well at work until recently when he was required to complete a certification course for his job. This task has proven challenging without treatment for his ADD. A prescription for Adderall 10 mg, to be taken twice daily, will be initiated next week. He will provide updates on his progress, and dosage adjustments will be made as necessary.    3. Hypertension.  His blood pressure is well-regulated with the current regimen of lisinopril and amlodipine.    4. Hyperlipidemia.  His lipid panel results are currently pending. It is anticipated that he may require statin therapy in the future. For now, he is advised to maintain a healthy diet.    Follow-up  The patient will follow up in 3 months.      Labs  Results      Counseling was given to patient for the following topics: appropriate exercise 150 minutes per week, disease prevention, and healthy eating habits.    Plan of care reviewed with patient at the conclusion of today's visit. Education was provided regarding diagnosis, management, and any prescribed or recommended OTC medications.Patient verbalizes understanding of and agreement with management plan.      Patient or patient representative verbalized consent for the use of Ambient Listening during the visit with  Flo Wong MD for chart documentation. 1/18/2025  17:11 RADHA Wong MD

## 2025-01-17 NOTE — TELEPHONE ENCOUNTER
[facility] is out of stock on amphetamine-dextroamphetamine (Adderall) 10 MG tablet. Please send to [address] and [place]

## 2025-01-22 RX ORDER — LISINOPRIL 10 MG/1
10 TABLET ORAL DAILY
Qty: 90 TABLET | Refills: 1 | Status: SHIPPED | OUTPATIENT
Start: 2025-01-22

## 2025-01-22 NOTE — TELEPHONE ENCOUNTER
Rx Refill Note  Requested Prescriptions     Pending Prescriptions Disp Refills    lisinopril (PRINIVIL,ZESTRIL) 10 MG tablet [Pharmacy Med Name: LISINOPRIL 10 MG TABLET] 90 tablet 1     Sig: TAKE 1 TABLET BY MOUTH DAILY      Last office visit with prescribing clinician: 1/17/2025   Last telemedicine visit with prescribing clinician: Visit date not found   Next office visit with prescribing clinician: 7/22/2025                         Would you like a call back once the refill request has been completed: [] Yes [] No    If the office needs to give you a call back, can they leave a voicemail: [] Yes [] No    Reema Lopez MA  01/22/25, 08:28 EST

## 2025-01-28 DIAGNOSIS — F90.0 ATTENTION DEFICIT HYPERACTIVITY DISORDER, PREDOMINANTLY INATTENTIVE TYPE: ICD-10-CM

## 2025-01-28 RX ORDER — DEXTROAMPHETAMINE SACCHARATE, AMPHETAMINE ASPARTATE, DEXTROAMPHETAMINE SULFATE AND AMPHETAMINE SULFATE 2.5; 2.5; 2.5; 2.5 MG/1; MG/1; MG/1; MG/1
TABLET ORAL
Qty: 90 TABLET | Refills: 0 | Status: SHIPPED | OUTPATIENT
Start: 2025-01-28

## 2025-03-11 DIAGNOSIS — F90.0 ATTENTION DEFICIT HYPERACTIVITY DISORDER, PREDOMINANTLY INATTENTIVE TYPE: ICD-10-CM

## 2025-03-11 RX ORDER — DEXTROAMPHETAMINE SACCHARATE, AMPHETAMINE ASPARTATE, DEXTROAMPHETAMINE SULFATE AND AMPHETAMINE SULFATE 2.5; 2.5; 2.5; 2.5 MG/1; MG/1; MG/1; MG/1
TABLET ORAL
Qty: 90 TABLET | Refills: 0 | Status: SHIPPED | OUTPATIENT
Start: 2025-03-11

## 2025-03-11 NOTE — TELEPHONE ENCOUNTER
Caller: Drew Lobo    Relationship: Self    Best call back number: [phone]     Requested Prescriptions:   Requested Prescriptions     Pending Prescriptions Disp Refills    amphetamine-dextroamphetamine (Adderall) 10 MG tablet 90 tablet 0     Sig: Take 2 tablets in the morning, one in the afternoon.        Pharmacy where request should be sent: [facility] [id number] [address] & [address] [facility] [phone] [facility] [phone] FX     Last office visit with prescribing clinician: 1/17/2025   Last telemedicine visit with prescribing clinician: Visit date not found   Next office visit with prescribing clinician: 7/22/2025     Does the patient have less than a 3 day supply:  [x] Yes  [] No    Would you like a call back once the refill request has been completed: [] Yes [] No    If the office needs to give you a call back, can they leave a voicemail: [] Yes [] No    Telma Montero   03/11/25 11:02 EDT

## 2025-03-12 DIAGNOSIS — E11.649 UNCONTROLLED TYPE 2 DIABETES MELLITUS WITH HYPOGLYCEMIA WITHOUT COMA: ICD-10-CM

## 2025-03-12 RX ORDER — METFORMIN HYDROCHLORIDE 500 MG/1
500 TABLET, EXTENDED RELEASE ORAL
Qty: 90 TABLET | Refills: 1 | Status: SHIPPED | OUTPATIENT
Start: 2025-03-12

## 2025-03-12 NOTE — TELEPHONE ENCOUNTER
Rx Refill Note  Requested Prescriptions     Pending Prescriptions Disp Refills    metFORMIN ER (GLUCOPHAGE-XR) 500 MG 24 hr tablet [Pharmacy Med Name: METFORMIN HCL  MG TABLET] 90 tablet 1     Sig: TAKE 1 TABLET BY MOUTH DAILY WITH BREAKFAST      Last office visit with prescribing clinician: 1/17/2025   Last telemedicine visit with prescribing clinician: Visit date not found   Next office visit with prescribing clinician: 7/22/2025                         Would you like a call back once the refill request has been completed: [] Yes [] No    If the office needs to give you a call back, can they leave a voicemail: [] Yes [] No    Leonora Salas LPN  03/12/25, 08:11 EDT

## 2025-04-18 DIAGNOSIS — F90.0 ATTENTION DEFICIT HYPERACTIVITY DISORDER, PREDOMINANTLY INATTENTIVE TYPE: ICD-10-CM

## 2025-04-18 RX ORDER — DEXTROAMPHETAMINE SACCHARATE, AMPHETAMINE ASPARTATE, DEXTROAMPHETAMINE SULFATE AND AMPHETAMINE SULFATE 2.5; 2.5; 2.5; 2.5 MG/1; MG/1; MG/1; MG/1
TABLET ORAL
Qty: 90 TABLET | Refills: 0 | Status: SHIPPED | OUTPATIENT
Start: 2025-04-18

## 2025-04-18 NOTE — TELEPHONE ENCOUNTER
Caller: Drew Lobo    Relationship: Self    Best call back number: [phone]     Requested Prescriptions:   Requested Prescriptions     Pending Prescriptions Disp Refills    amphetamine-dextroamphetamine (Adderall) 10 MG tablet 90 tablet 0     Sig: Take 2 tablets in the morning, one in the afternoon.        Pharmacy where request should be sent: [facility] [id number] [address] [facility] [phone] [facility] [phone] FX     Last office visit with prescribing clinician: 1/17/2025   Last telemedicine visit with prescribing clinician: Visit date not found   Next office visit with prescribing clinician: 7/22/2025     Additional details provided by patient:     Does the patient have less than a 3 day supply:  [x] Yes  [] No    Would you like a call back once the refill request has been completed: [] Yes [x] No    If the office needs to give you a call back, can they leave a voicemail: [] Yes [x] No    Telma Salvador Rep   04/18/25 10:33 EDT

## 2025-04-18 NOTE — TELEPHONE ENCOUNTER
Rx Refill Note  Requested Prescriptions     Pending Prescriptions Disp Refills    amphetamine-dextroamphetamine (Adderall) 10 MG tablet 90 tablet 0     Sig: Take 2 tablets in the morning, one in the afternoon.      Last office visit with prescribing clinician: 1/17/2025   Next office visit with prescribing clinician: 7/22/2025     LA: 03/11/25 #90 0R                          Would you like a call back once the refill request has been completed: [] Yes [] No    If the office needs to give you a call back, can they leave a voicemail: [] Yes [] No    Angelica Gibbs LPN  04/18/25, 10:34 EDT

## 2025-05-22 DIAGNOSIS — F90.0 ATTENTION DEFICIT HYPERACTIVITY DISORDER, PREDOMINANTLY INATTENTIVE TYPE: ICD-10-CM

## 2025-05-22 RX ORDER — HYDROCHLOROTHIAZIDE 25 MG/1
25 TABLET ORAL DAILY
Qty: 30 TABLET | Refills: 5 | Status: SHIPPED | OUTPATIENT
Start: 2025-05-22

## 2025-05-22 RX ORDER — DEXTROAMPHETAMINE SACCHARATE, AMPHETAMINE ASPARTATE, DEXTROAMPHETAMINE SULFATE AND AMPHETAMINE SULFATE 2.5; 2.5; 2.5; 2.5 MG/1; MG/1; MG/1; MG/1
TABLET ORAL
Qty: 90 TABLET | Refills: 0 | Status: SHIPPED | OUTPATIENT
Start: 2025-05-22

## 2025-05-22 NOTE — TELEPHONE ENCOUNTER
Caller: Drew Lobo    Relationship: Self    Best call back number:      Requested Prescriptions:   Requested Prescriptions     Pending Prescriptions Disp Refills    amphetamine-dextroamphetamine (Adderall) 10 MG tablet 90 tablet 0     Sig: Take 2 tablets in the morning, one in the afternoon.    hydroCHLOROthiazide 25 MG tablet     Pharmacy where request should be sent: [facility] [id number] [address] [facility] [phone] [facility] [phone] FX     Last office visit with prescribing clinician: 1/17/2025   Last telemedicine visit with prescribing clinician: Visit date not found   Next office visit with prescribing clinician: 7/22/2025     Additional details provided by patient: PATIENT HAS 2 DAYS LEFT AND HAS NONE LEFT ON [facility]    Does the patient have less than a 3 day supply:  [x] Yes  [] No      Telma Butt Rep   05/22/25 09:53 EDT

## 2025-06-23 DIAGNOSIS — F90.0 ATTENTION DEFICIT HYPERACTIVITY DISORDER, PREDOMINANTLY INATTENTIVE TYPE: ICD-10-CM

## 2025-06-23 RX ORDER — DEXTROAMPHETAMINE SACCHARATE, AMPHETAMINE ASPARTATE, DEXTROAMPHETAMINE SULFATE AND AMPHETAMINE SULFATE 2.5; 2.5; 2.5; 2.5 MG/1; MG/1; MG/1; MG/1
TABLET ORAL
Qty: 90 TABLET | Refills: 0 | Status: SHIPPED | OUTPATIENT
Start: 2025-06-23

## 2025-06-23 NOTE — TELEPHONE ENCOUNTER
Caller: Drew Lobo    Relationship: Self    Best call back number: [phone]     Requested Prescriptions:   Requested Prescriptions     Pending Prescriptions Disp Refills    amphetamine-dextroamphetamine (Adderall) 10 MG tablet 90 tablet 0     Sig: Take 2 tablets in the morning, one in the afternoon.        Pharmacy where request should be sent: [facility] [id number] [address] & [address] [facility] [phone] [facility] [phone] FX     Last office visit with prescribing clinician: 1/17/2025   Last telemedicine visit with prescribing clinician: Visit date not found   Next office visit with prescribing clinician: 7/22/2025     Does the patient have less than a 3 day supply:  [x] Yes  [] No    Would you like a call back once the refill request has been completed: [] Yes [] No    If the office needs to give you a call back, can they leave a voicemail: [] Yes [] No    Telma Montero Rep   06/23/25 11:50 EDT

## 2025-07-23 RX ORDER — LISINOPRIL 10 MG/1
10 TABLET ORAL DAILY
Qty: 90 TABLET | Refills: 1 | Status: SHIPPED | OUTPATIENT
Start: 2025-07-23

## 2025-07-23 NOTE — TELEPHONE ENCOUNTER
Rx Refill Note  Requested Prescriptions     Pending Prescriptions Disp Refills    lisinopril (PRINIVIL,ZESTRIL) 10 MG tablet [Pharmacy Med Name: LISINOPRIL 10 MG TABLET] 90 tablet 1     Sig: TAKE 1 TABLET BY MOUTH DAILY      Last office visit with prescribing clinician: 1/17/2025   Last telemedicine visit with prescribing clinician: Visit date not found   Next office visit with prescribing clinician: 8/5/2025                         Would you like a call back once the refill request has been completed: [] Yes [] No    If the office needs to give you a call back, can they leave a voicemail: [] Yes [] No    Leonora Salas LPN  07/23/25, 08:02 EDT

## 2025-07-24 ENCOUNTER — TELEPHONE (OUTPATIENT)
Dept: INTERNAL MEDICINE | Facility: CLINIC | Age: 45
End: 2025-07-24
Payer: COMMERCIAL

## 2025-07-24 DIAGNOSIS — E11.65 TYPE 2 DIABETES MELLITUS WITH HYPERGLYCEMIA, WITHOUT LONG-TERM CURRENT USE OF INSULIN: Primary | ICD-10-CM

## 2025-07-24 DIAGNOSIS — F90.0 ATTENTION DEFICIT HYPERACTIVITY DISORDER, PREDOMINANTLY INATTENTIVE TYPE: ICD-10-CM

## 2025-07-24 RX ORDER — HYDROCHLOROTHIAZIDE 12.5 MG/1
CAPSULE ORAL CONTINUOUS
Qty: 3 EACH | Refills: 5 | Status: SHIPPED | OUTPATIENT
Start: 2025-07-24

## 2025-07-24 RX ORDER — DEXTROAMPHETAMINE SACCHARATE, AMPHETAMINE ASPARTATE, DEXTROAMPHETAMINE SULFATE AND AMPHETAMINE SULFATE 2.5; 2.5; 2.5; 2.5 MG/1; MG/1; MG/1; MG/1
TABLET ORAL
Qty: 90 TABLET | Refills: 0 | Status: SHIPPED | OUTPATIENT
Start: 2025-07-24

## 2025-07-24 NOTE — TELEPHONE ENCOUNTER
Caller: Drew Lobo    Relationship: Self    Best call back number: [phone]     What is the best time to reach you: ANYTIME     Who are you requesting to speak with (clinical staff, provider,  specific staff member): CLINICAL STAFF    What was the call regarding: PATIENT STATES THAT HE IS BEING TOLD THAT THE OFFICE NEEDS TO SEND A NEW PRESCRIPTION OVER TO THE [facility] FOR HIS DIABETIC SUPPLIES. HE SAYS THAT HIS INSURANCE IS WANTING TO HAVE HIM BE ON THE NEWEST VERSION OF THE FREESTYLE JOE.     Is it okay if the provider responds through MyChart: YES

## 2025-07-24 NOTE — TELEPHONE ENCOUNTER
Caller: Drew Lobo    Relationship: Self    Best call back number: [phone]     Requested Prescriptions:   Requested Prescriptions     Pending Prescriptions Disp Refills    amphetamine-dextroamphetamine (Adderall) 10 MG tablet 90 tablet 0     Sig: Take 2 tablets in the morning, one in the afternoon.        Pharmacy where request should be sent: [facility] [id number] [address] [facility] [phone] [facility] [phone]      Last office visit with prescribing clinician: 1/17/2025   Last telemedicine visit with prescribing clinician: Visit date not found   Next office visit with prescribing clinician: 8/5/2025     Additional details provided by patient: PATIENT HAS ABOUT 2-3 DAYS     Does the patient have less than a 3 day supply:  [x] Yes  [] No    Would you like a call back once the refill request has been completed: [x] Yes [] No    If the office needs to give you a call back, can they leave a voicemail: [x] Yes [] No    Telma Irby   07/24/25 12:09 EDT

## 2025-08-12 DIAGNOSIS — E11.649 UNCONTROLLED TYPE 2 DIABETES MELLITUS WITH HYPOGLYCEMIA WITHOUT COMA: ICD-10-CM

## 2025-08-12 RX ORDER — AMLODIPINE BESYLATE 10 MG/1
10 TABLET ORAL DAILY
Qty: 90 TABLET | Refills: 1 | Status: SHIPPED | OUTPATIENT
Start: 2025-08-12

## 2025-08-12 RX ORDER — METFORMIN HYDROCHLORIDE 500 MG/1
500 TABLET, EXTENDED RELEASE ORAL
Qty: 90 TABLET | Refills: 1 | Status: SHIPPED | OUTPATIENT
Start: 2025-08-12

## 2025-08-27 DIAGNOSIS — F90.0 ATTENTION DEFICIT HYPERACTIVITY DISORDER, PREDOMINANTLY INATTENTIVE TYPE: ICD-10-CM

## 2025-08-27 DIAGNOSIS — E11.65 TYPE 2 DIABETES MELLITUS WITH HYPERGLYCEMIA, WITHOUT LONG-TERM CURRENT USE OF INSULIN: ICD-10-CM

## 2025-08-27 RX ORDER — DEXTROAMPHETAMINE SACCHARATE, AMPHETAMINE ASPARTATE, DEXTROAMPHETAMINE SULFATE AND AMPHETAMINE SULFATE 2.5; 2.5; 2.5; 2.5 MG/1; MG/1; MG/1; MG/1
TABLET ORAL
Qty: 90 TABLET | Refills: 0 | Status: SHIPPED | OUTPATIENT
Start: 2025-08-27

## 2025-08-27 RX ORDER — HYDROCHLOROTHIAZIDE 12.5 MG/1
CAPSULE ORAL CONTINUOUS
Qty: 3 EACH | Refills: 5 | Status: SHIPPED | OUTPATIENT
Start: 2025-08-27